# Patient Record
Sex: FEMALE | Race: WHITE
[De-identification: names, ages, dates, MRNs, and addresses within clinical notes are randomized per-mention and may not be internally consistent; named-entity substitution may affect disease eponyms.]

---
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
## 2021-04-04 ENCOUNTER — HOSPITAL ENCOUNTER (EMERGENCY)
Dept: HOSPITAL 96 - M.ERS | Age: 21
LOS: 1 days | Discharge: HOME | End: 2021-04-05
Payer: COMMERCIAL

## 2021-04-04 VITALS — HEIGHT: 62 IN | BODY MASS INDEX: 19.88 KG/M2 | WEIGHT: 108 LBS
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
## 2021-04-04 DIAGNOSIS — S06.0X0A: Primary | ICD-10-CM
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
## 2021-04-04 DIAGNOSIS — N93.8: ICD-10-CM

## 2021-04-04 DIAGNOSIS — F17.210: ICD-10-CM

## 2021-04-04 DIAGNOSIS — Z79.899: ICD-10-CM

## 2021-04-04 DIAGNOSIS — S13.4XXA: ICD-10-CM

## 2021-04-04 LAB
BACTERIA-REFLEX: (no result) /HPF
BILIRUB UR-MCNC: NEGATIVE MG/DL
COLOR UR: YELLOW
KETONES UR STRIP-MCNC: (no result) MG/DL
MUCUS: (no result) STRN/LPF
PROT UR QL STRIP: NEGATIVE
RBC # UR STRIP: (no result) /UL
SP GR UR STRIP: >= 1.03 (ref 1–1.03)
SQUAMOUS: (no result) /LPF (ref 0–3)
THC: POSITIVE
URINE CLARITY: (no result)
URINE GLUCOSE-RANDOM: NEGATIVE
URINE LEUKOCYTES-REFLEX: NEGATIVE
URINE NITRITE-REFLEX: NEGATIVE
URINE WBC-REFLEX: (no result) /HPF (ref 0–5)
UROBILINOGEN UR STRIP-ACNC: 0.2 E.U./DL (ref 0.2–1)

## 2021-04-05 VITALS — SYSTOLIC BLOOD PRESSURE: 101 MMHG | DIASTOLIC BLOOD PRESSURE: 44 MMHG

## 2021-09-07 ENCOUNTER — HOSPITAL ENCOUNTER (EMERGENCY)
Dept: HOSPITAL 96 - M.ERS | Age: 21
Discharge: HOME | End: 2021-09-07
Payer: COMMERCIAL

## 2021-09-07 VITALS — DIASTOLIC BLOOD PRESSURE: 80 MMHG | SYSTOLIC BLOOD PRESSURE: 110 MMHG

## 2021-09-07 VITALS — WEIGHT: 108 LBS | BODY MASS INDEX: 19.88 KG/M2 | HEIGHT: 62 IN

## 2021-09-07 DIAGNOSIS — U07.1: Primary | ICD-10-CM

## 2021-09-07 DIAGNOSIS — K52.9: ICD-10-CM

## 2021-09-07 DIAGNOSIS — Z88.8: ICD-10-CM

## 2021-09-07 DIAGNOSIS — F17.210: ICD-10-CM

## 2021-09-07 DIAGNOSIS — R07.89: ICD-10-CM

## 2021-09-07 DIAGNOSIS — Z87.42: ICD-10-CM

## 2021-09-07 LAB
ABSOLUTE BASOPHILS: 0 THOU/UL (ref 0–0.2)
ABSOLUTE EOSINOPHILS: 0 THOU/UL (ref 0–0.7)
ABSOLUTE MONOCYTES: 0.9 THOU/UL (ref 0–1.2)
ALBUMIN SERPL-MCNC: 3.8 G/DL (ref 3.4–5)
ALP SERPL-CCNC: 114 U/L (ref 46–116)
ALT SERPL-CCNC: 25 U/L (ref 30–65)
ANION GAP SERPL CALC-SCNC: 10 MMOL/L (ref 7–16)
AST SERPL-CCNC: 17 U/L (ref 15–37)
BASOPHILS NFR BLD AUTO: 0.8 %
BILIRUB SERPL-MCNC: 0.3 MG/DL
BILIRUB UR-MCNC: NEGATIVE MG/DL
BUN SERPL-MCNC: 7 MG/DL (ref 7–18)
CALCIUM SERPL-MCNC: 8.6 MG/DL (ref 8.5–10.1)
CHLORIDE SERPL-SCNC: 104 MMOL/L (ref 98–107)
CO2 SERPL-SCNC: 23 MMOL/L (ref 21–32)
COLOR UR: YELLOW
CREAT SERPL-MCNC: 0.9 MG/DL (ref 0.6–1.3)
EOSINOPHIL NFR BLD: 1 %
GLUCOSE SERPL-MCNC: 115 MG/DL (ref 70–99)
GRANULOCYTES NFR BLD MANUAL: 69.6 %
HCT VFR BLD CALC: 38.6 % (ref 37–47)
HGB BLD-MCNC: 13.1 GM/DL (ref 12–15)
KETONES UR STRIP-MCNC: (no result) MG/DL
LIPASE: 61 U/L (ref 73–393)
LYMPHOCYTES # BLD: 0.4 THOU/UL (ref 0.8–5.3)
LYMPHOCYTES NFR BLD AUTO: 9.1 %
MCH RBC QN AUTO: 30.3 PG (ref 26–34)
MCHC RBC AUTO-ENTMCNC: 33.9 G/DL (ref 28–37)
MCV RBC: 89.4 FL (ref 80–100)
MONOCYTES NFR BLD: 19.5 %
MPV: 8 FL. (ref 7.2–11.1)
NEUTROPHILS # BLD: 3.1 THOU/UL (ref 1.6–8.1)
NUCLEATED RBCS: 0 /100WBC
PLATELET COUNT*: 192 THOU/UL (ref 150–400)
POTASSIUM SERPL-SCNC: 3.6 MMOL/L (ref 3.5–5.1)
PROT SERPL-MCNC: 6.9 G/DL (ref 6.4–8.2)
PROT UR QL STRIP: NEGATIVE
RBC # BLD AUTO: 4.32 MIL/UL (ref 4.2–5)
RBC # UR STRIP: NEGATIVE /UL
RDW-CV: 13.6 % (ref 10.5–14.5)
SODIUM SERPL-SCNC: 137 MMOL/L (ref 136–145)
SP GR UR STRIP: 1.02 (ref 1–1.03)
URINE CLARITY: CLEAR
URINE GLUCOSE-RANDOM: NEGATIVE
URINE LEUKOCYTES-REFLEX: NEGATIVE
URINE NITRITE-REFLEX: NEGATIVE
UROBILINOGEN UR STRIP-ACNC: 1 E.U./DL (ref 0.2–1)
WBC # BLD AUTO: 4.4 THOU/UL (ref 4–11)

## 2021-09-07 NOTE — EKG
[place] [address]
Phone:  [phone]                     ELECTROCARDIOGRAM REPORT      
_______________________________________________________________________________
 
Name:         MARIO MORENO                Room:                     [facility] 
M.R.#:    [id number]     Account #:     [id number]  
Admission:    21    Attend Phys:                     
Discharge:                Date of Birth: [birth year]  
Date of Service: 21 1327  Report #:      [id number]
        [id number]
_______________________________________________________________________________
THIS REPORT FOR:  //name//                      
 
                         [facility] ED
                                       
Test Date:    2021               Test Time:    13:27:12
Pat Name:     MARIO MORENO             Department:   
Patient ID:   [id number]            Room:          
Gender:       F                        Technician:   [facility]
:          2000               Requested By: Shira Martins
Order Number: [id number] MD:   Patrick Jimenez
                                 Measurements
Intervals                              Axis          
Rate:         82                       P:            71
VA:           174                      QRS:          107
QRSD:         87                       T:            27
QT:           342                                    
QTc:          400                                    
                           Interpretive Statements
Sinus rhythm
Baseline wander in lead(s) II,III,aVF
No previous ECG available for comparison
Electronically Signed On 2021 17:51:28 CDT by Patrick Jimenez
[email or web address]
 
 
 
 
 
 
 
 
 
 
 
 
 
 
 
 
 
 
 
 
 
  <ELECTRONICALLY SIGNED>
                                           By: Patrick Jimenez MD, [facility]   
  21     175
D: 21 1327   _____________________________________
T: 21 1327   Patrick Jimenez MD, [facility]     /EPI

## 2022-01-10 ENCOUNTER — HOSPITAL ENCOUNTER (EMERGENCY)
Dept: HOSPITAL 96 - M.ERS | Age: 22
Discharge: HOME | End: 2022-01-10
Payer: COMMERCIAL

## 2022-01-10 VITALS — WEIGHT: 110.01 LBS | BODY MASS INDEX: 20.24 KG/M2 | HEIGHT: 62 IN

## 2022-01-10 VITALS — DIASTOLIC BLOOD PRESSURE: 73 MMHG | SYSTOLIC BLOOD PRESSURE: 125 MMHG

## 2022-01-10 DIAGNOSIS — R07.89: Primary | ICD-10-CM

## 2022-01-10 DIAGNOSIS — Z88.8: ICD-10-CM

## 2022-01-10 DIAGNOSIS — R10.9: ICD-10-CM

## 2022-01-10 DIAGNOSIS — F17.210: ICD-10-CM

## 2022-01-10 DIAGNOSIS — R06.02: ICD-10-CM

## 2022-01-10 DIAGNOSIS — R07.81: ICD-10-CM

## 2022-01-10 DIAGNOSIS — Z87.42: ICD-10-CM
